# Patient Record
Sex: FEMALE | Race: WHITE | NOT HISPANIC OR LATINO | Employment: FULL TIME | URBAN - METROPOLITAN AREA
[De-identification: names, ages, dates, MRNs, and addresses within clinical notes are randomized per-mention and may not be internally consistent; named-entity substitution may affect disease eponyms.]

---

## 2023-11-19 ENCOUNTER — HOSPITAL ENCOUNTER (EMERGENCY)
Facility: HOSPITAL | Age: 33
Discharge: HOME | End: 2023-11-19
Attending: EMERGENCY MEDICINE | Admitting: EMERGENCY MEDICINE
Payer: COMMERCIAL

## 2023-11-19 VITALS
HEIGHT: 63 IN | RESPIRATION RATE: 15 BRPM | DIASTOLIC BLOOD PRESSURE: 76 MMHG | BODY MASS INDEX: 22.15 KG/M2 | WEIGHT: 125 LBS | SYSTOLIC BLOOD PRESSURE: 113 MMHG | TEMPERATURE: 98.3 F | HEART RATE: 97 BPM | OXYGEN SATURATION: 98 %

## 2023-11-19 DIAGNOSIS — L29.9 ITCHING: Primary | ICD-10-CM

## 2023-11-19 PROCEDURE — 93005 ELECTROCARDIOGRAM TRACING: CPT

## 2023-11-19 PROCEDURE — 99282 EMERGENCY DEPT VISIT SF MDM: CPT

## 2023-11-19 RX ORDER — EPINEPHRINE 0.3 MG/.3ML
0.3 INJECTION SUBCUTANEOUS ONCE
Qty: 1 EACH | Refills: 0 | Status: SHIPPED | OUTPATIENT
Start: 2023-11-19 | End: 2023-11-19

## 2023-11-20 LAB
QT INTERVAL: 370 MS
QTC INTERVAL: 414 MS

## 2023-11-20 NOTE — ED NOTES
Pt states Monday she develops hives on her face, neck, and back. Friday she got started on prednisone tablets. Pt states the hives are still intermittently developing still.

## 2023-11-20 NOTE — DISCHARGE INSTRUCTIONS
Follow-up with your primary care provider  Continue steroid  EpiPen prescribed in case of severe allergic reaction anaphylaxis  Recommend starting Pepcid  Return to ED for new or worsening

## 2023-11-20 NOTE — ED PROVIDER NOTES
Subjective   Chief Complaint   Patient presents with    Itching       History of Present Illness  Patient is a 33-year-old female presents the ED with complaint of itching, hives.  She was recently seen in her PCPs office for hives, started on prednisone.  She reports tonight she was concerned she had hives on her face.  Denies any shortness of breath, oral swelling or tongue swelling.  No nausea vomiting diarrhea.  No new exposures.  She reports they thought it was secondary to anxiety.  Patient reports that she had been waiting, symptoms have resolved.  Denies any complaints at this time  Review of Systems   Constitutional:  Negative for chills and fever.   Respiratory:  Negative for chest tightness, shortness of breath and wheezing.    Cardiovascular:  Negative for chest pain.   Gastrointestinal:  Negative for diarrhea and vomiting.   Musculoskeletal:  Negative for back pain and neck pain.   Skin:  Positive for rash. Negative for color change.       No past medical history on file.    Allergies   Allergen Reactions    Sulfa Antibiotics Hives       Past Surgical History:   Procedure Laterality Date     SECTION  2016       No family history on file.    Social History     Socioeconomic History    Marital status: Single   Tobacco Use    Smoking status: Never    Smokeless tobacco: Never   Substance and Sexual Activity    Alcohol use: No    Drug use: No    Sexual activity: Defer           Objective   Physical Exam  Vitals and nursing note reviewed.   Constitutional:       Appearance: Normal appearance. She is not ill-appearing or toxic-appearing.   HENT:      Head: Normocephalic and atraumatic.      Comments: Patient has no evidence for airway compromise, no angioedema, no oral or tongue swelling.     Nose: Nose normal.      Mouth/Throat:      Mouth: Mucous membranes are moist.      Pharynx: Oropharynx is clear.   Eyes:      Conjunctiva/sclera: Conjunctivae normal.      Pupils: Pupils are equal, round, and  "reactive to light.   Cardiovascular:      Rate and Rhythm: Normal rate and regular rhythm.      Heart sounds: Normal heart sounds. No murmur heard.     No friction rub. No gallop.   Pulmonary:      Effort: Pulmonary effort is normal.      Breath sounds: Normal breath sounds.   Abdominal:      General: Bowel sounds are normal.      Palpations: Abdomen is soft.      Tenderness: There is no abdominal tenderness. There is no guarding or rebound.   Musculoskeletal:         General: Normal range of motion.      Cervical back: Normal range of motion and neck supple.      Right lower leg: No edema.      Left lower leg: No edema.   Skin:     General: Skin is warm and dry.      Capillary Refill: Capillary refill takes less than 2 seconds.      Findings: No rash.   Neurological:      General: No focal deficit present.      Mental Status: She is alert and oriented to person, place, and time.   Psychiatric:         Mood and Affect: Mood normal.         Behavior: Behavior normal.         Procedures           ED Course  ED Course as of 11/20/23 0322   Mon Nov 20, 2023   0316 EKG sinus rhythm rate 75.  No previous available.  No acute ST change.  [facility] ED attending physician. [LB]      ED Course User Index  [LB] Brittanie Farmer, APRN      /76   Pulse 97   Temp 98.3 °F (36.8 °C) (Oral)   Resp 15   Ht 160 cm (63\")   Wt 56.7 kg (125 lb)   LMP 10/24/2023 (Approximate)   SpO2 98%   BMI 22.14 kg/m²   Medications - No data to display  No radiology results for the last day  Lab Results (last 24 hours)       ** No results found for the last 24 hours. **                                               Medical Decision Making  Problems Addressed:  Itching: complicated acute illness or injury    Risk  Prescription drug management.    Patient is a 33-year-old female presents to the ED with complaint of itching, hives.  She was recently treated with prednisone.  She reports that they were on her face neck and back " today, and may spread to her previous prompted coming to the ED, however she reports since she has been here they resolved.  I spoke with her at the bedside, we discussed further treatment monitoring here, however patient reports that she feels much better she would like to go home.  Advised her to continue her current prednisone, give her an EpiPen in case of emergency, and advised use of steroids as well.  Given her ENT and allergy follow-up.  I spoke with the patient at the bedside regarding their plan of care, discharge instruction,  prescriptions, as well as reasons to return to the emergency department.  We discussed test results at the bedside, including incidental abnormal labs, radiological findings, understands need and importance  for follow-up with primary care or specialist if indicated.  Patient verbalizes understanding and agrees to the treatment plan at this time.         Final diagnoses:   Itching       ED Disposition  ED Disposition       ED Disposition   Discharge    Condition   Stable    Comment   --               [facility] EMERGENCY DEPARTMENT  [address] [id number] [phone]    If symptoms worsen    PATIENT CONNECTION - [facility] [address]  [phone]  Schedule an appointment as soon as possible for a visit   Call for assistance with follow up with Primary care provider-call tomorrow.    [facility]  [address] [place] [id number] [phone]             Medication List        ASK your doctor about these medications      EPINEPHrine 0.3 MG/0.3ML solution auto-injector injection  Commonly known as: EPIPEN  Inject 0.3 mL under the skin into the appropriate area as directed 1 (One) Time for 1 dose.  Ask about: Should I take this medication?               Where to Get Your Medications        These medications were sent to [facility] #[id number] - [facility] [place], [place] - [address] AT [facility] [address] - [phone] PH - [phone] FX  [address] [place] [address]      Phone: [phone]   EPINEPHrine 0.3 MG/0.3ML solution auto-injector injection            Brittanie Farmer APRN  11/20/23 0326

## 2025-02-14 PROCEDURE — 87081 CULTURE SCREEN ONLY: CPT | Performed by: NURSE PRACTITIONER

## 2025-03-15 ENCOUNTER — HOSPITAL ENCOUNTER (EMERGENCY)
Facility: HOSPITAL | Age: 35
Discharge: HOME | End: 2025-03-15
Payer: COMMERCIAL

## 2025-03-15 VITALS
BODY MASS INDEX: 23.05 KG/M2 | RESPIRATION RATE: 18 BRPM | TEMPERATURE: 99.9 F | OXYGEN SATURATION: 97 % | SYSTOLIC BLOOD PRESSURE: 112 MMHG | WEIGHT: 130.07 LBS | DIASTOLIC BLOOD PRESSURE: 74 MMHG | HEART RATE: 111 BPM | HEIGHT: 63 IN

## 2025-03-15 DIAGNOSIS — R51.9 ACUTE NONINTRACTABLE HEADACHE, UNSPECIFIED HEADACHE TYPE: Primary | ICD-10-CM

## 2025-03-15 DIAGNOSIS — B34.8 RHINOVIRUS: ICD-10-CM

## 2025-03-15 LAB
B PARAPERT DNA SPEC QL NAA+PROBE: NOT DETECTED
B PERT DNA SPEC QL NAA+PROBE: NOT DETECTED
C PNEUM DNA NPH QL NAA+NON-PROBE: NOT DETECTED
FLUAV SUBTYP SPEC NAA+PROBE: NOT DETECTED
FLUBV RNA ISLT QL NAA+PROBE: NOT DETECTED
HADV DNA SPEC NAA+PROBE: NOT DETECTED
HCOV 229E RNA SPEC QL NAA+PROBE: NOT DETECTED
HCOV HKU1 RNA SPEC QL NAA+PROBE: NOT DETECTED
HCOV NL63 RNA SPEC QL NAA+PROBE: NOT DETECTED
HCOV OC43 RNA SPEC QL NAA+PROBE: NOT DETECTED
HMPV RNA NPH QL NAA+NON-PROBE: NOT DETECTED
HPIV1 RNA ISLT QL NAA+PROBE: NOT DETECTED
HPIV2 RNA SPEC QL NAA+PROBE: NOT DETECTED
HPIV3 RNA NPH QL NAA+PROBE: NOT DETECTED
HPIV4 P GENE NPH QL NAA+PROBE: NOT DETECTED
M PNEUMO IGG SER IA-ACNC: NOT DETECTED
RHINOVIRUS RNA SPEC NAA+PROBE: DETECTED
RSV RNA NPH QL NAA+NON-PROBE: NOT DETECTED
SARS-COV-2 RNA RESP QL NAA+PROBE: NOT DETECTED

## 2025-03-15 PROCEDURE — 0202U NFCT DS 22 TRGT SARS-COV-2: CPT | Performed by: NURSE PRACTITIONER

## 2025-03-15 PROCEDURE — 25810000003 SODIUM CHLORIDE 0.9 % SOLUTION: Performed by: NURSE PRACTITIONER

## 2025-03-15 PROCEDURE — 25010000002 KETOROLAC TROMETHAMINE PER 15 MG: Performed by: NURSE PRACTITIONER

## 2025-03-15 PROCEDURE — 25010000002 DIPHENHYDRAMINE PER 50 MG: Performed by: NURSE PRACTITIONER

## 2025-03-15 PROCEDURE — 96374 THER/PROPH/DIAG INJ IV PUSH: CPT

## 2025-03-15 PROCEDURE — 25010000002 PROCHLORPERAZINE 10 MG/2ML SOLUTION: Performed by: NURSE PRACTITIONER

## 2025-03-15 PROCEDURE — 96375 TX/PRO/DX INJ NEW DRUG ADDON: CPT

## 2025-03-15 PROCEDURE — 99283 EMERGENCY DEPT VISIT LOW MDM: CPT

## 2025-03-15 RX ORDER — ACETAMINOPHEN 500 MG
1000 TABLET ORAL ONCE
Status: COMPLETED | OUTPATIENT
Start: 2025-03-15 | End: 2025-03-15

## 2025-03-15 RX ORDER — DIPHENHYDRAMINE HYDROCHLORIDE 50 MG/ML
25 INJECTION INTRAMUSCULAR; INTRAVENOUS ONCE
Status: COMPLETED | OUTPATIENT
Start: 2025-03-15 | End: 2025-03-15

## 2025-03-15 RX ORDER — PROCHLORPERAZINE EDISYLATE 5 MG/ML
10 INJECTION INTRAMUSCULAR; INTRAVENOUS ONCE
Status: COMPLETED | OUTPATIENT
Start: 2025-03-15 | End: 2025-03-15

## 2025-03-15 RX ORDER — SODIUM CHLORIDE 0.9 % (FLUSH) 0.9 %
10 SYRINGE (ML) INJECTION AS NEEDED
Status: DISCONTINUED | OUTPATIENT
Start: 2025-03-15 | End: 2025-03-15 | Stop reason: HOSPADM

## 2025-03-15 RX ORDER — KETOROLAC TROMETHAMINE 30 MG/ML
15 INJECTION, SOLUTION INTRAMUSCULAR; INTRAVENOUS ONCE
Status: COMPLETED | OUTPATIENT
Start: 2025-03-15 | End: 2025-03-15

## 2025-03-15 RX ADMIN — SODIUM CHLORIDE 1000 ML: 9 INJECTION, SOLUTION INTRAVENOUS at 03:00

## 2025-03-15 RX ADMIN — PROCHLORPERAZINE EDISYLATE 10 MG: 5 INJECTION, SOLUTION INTRAMUSCULAR; INTRAVENOUS at 03:00

## 2025-03-15 RX ADMIN — KETOROLAC TROMETHAMINE 15 MG: 30 INJECTION, SOLUTION INTRAMUSCULAR at 03:00

## 2025-03-15 RX ADMIN — DIPHENHYDRAMINE HYDROCHLORIDE 25 MG: 50 INJECTION, SOLUTION INTRAMUSCULAR; INTRAVENOUS at 03:00

## 2025-03-15 RX ADMIN — ACETAMINOPHEN 1000 MG: 500 TABLET, FILM COATED ORAL at 03:00

## 2025-03-15 NOTE — DISCHARGE INSTRUCTIONS
May continue alternating Tylenol and ibuprofen for discomfort.  May consider over-the-counter Stahist or Mucinex for congestion.  Drink plenty of fluids.  Rest today and tomorrow.  Follow-up with primary care provider as needed.  Return for new or worsening symptoms.

## 2025-03-15 NOTE — ED PROVIDER NOTES
Subjective   History of Present Illness  Patient is a 34-year-old white female with history of migraine headaches presents today with complaints of headache for the last 2 days and is not relieved with over-the-counter medications which normally relieve her migraine headaches.  She reports nausea but no vomiting.  Some dizziness and lightheadedness.  She does report some chills and has had some nasal congestion postnasal drip and cough.  Denies any fever neck pain or stiffness.  No rash.  No thunderclap onset.      Review of Systems   Constitutional:  Positive for chills.   HENT:  Positive for congestion, postnasal drip and rhinorrhea.    Respiratory:  Positive for cough.    Cardiovascular:  Negative for chest pain.   Gastrointestinal:  Positive for nausea. Negative for abdominal pain and vomiting.   Musculoskeletal:  Negative for neck pain and neck stiffness.   Skin:  Negative for rash.   Neurological:  Positive for dizziness and headaches.       No past medical history on file.    Allergies   Allergen Reactions    Sulfa Antibiotics Hives       Past Surgical History:   Procedure Laterality Date     SECTION      second c  section        No family history on file.    Social History     Socioeconomic History    Marital status: Single   Tobacco Use    Smoking status: Never     Passive exposure: Never    Smokeless tobacco: Never   Vaping Use    Vaping status: Never Used   Substance and Sexual Activity    Alcohol use: Yes     Comment: rarely    Drug use: No    Sexual activity: Defer           Objective   Physical Exam  Vital signs and triage nurse note reviewed.  Constitutional: Awake, alert; well-developed and well-nourished. No acute distress is noted.  HEENT: Normocephalic, atraumatic; pupils are PERRL with intact EOM; oropharynx is pink and moist without exudate or erythema.  No drooling or pooling of oral secretions.  Neck: Supple, full range of motion without pain; no cervical lymphadenopathy.  Normal phonation.  Cardiovascular: Regular rate and rhythm, normal S1-S2.  No murmur noted.  Pulmonary: Respiratory effort regular nonlabored, breath sounds clear to auscultation all fields.  Musculoskeletal: Independent range of motion of all extremities with no palpable tenderness or edema.  Neuro: Alert oriented x3, speech is clear and appropriate, GCS 15.    Skin: Flesh tone, warm, dry, intact; no erythematous or petechial rash or lesion.    Procedures           ED Course        Labs Reviewed   RESPIRATORY PANEL PCR W/ COVID-19 (SARS-COV-2), NP SWAB IN UTM/VTP, 2 HR TAT - Abnormal; Notable for the following components:       Result Value    Human Rhinovirus/Enterovirus Detected (*)     All other components within normal limits    Narrative:     In the setting of a positive respiratory panel with a viral infection PLUS a negative procalcitonin without other underlying concern for bacterial infection, consider observing off antibiotics or discontinuation of antibiotics and continue supportive care. If the respiratory panel is positive for atypical bacterial infection (Bordetella pertussis, Chlamydophila pneumoniae, or Mycoplasma pneumoniae), consider antibiotic de-escalation to target atypical bacterial infection.     No radiology results for the last day  Medications   sodium chloride 0.9 % bolus 1,000 mL (0 mL Intravenous Stopped 3/15/25 0431)   acetaminophen (TYLENOL) tablet 1,000 mg (1,000 mg Oral Given 3/15/25 0300)   diphenhydrAMINE (BENADRYL) injection 25 mg (25 mg Intravenous Given 3/15/25 0300)   prochlorperazine (COMPAZINE) injection 10 mg (10 mg Intravenous Given 3/15/25 0300)   ketorolac (TORADOL) injection 15 mg (15 mg Intravenous Given 3/15/25 0300)                                                    Medical Decision Making  Patient presents today with the above complaint.    She had above exam and evaluation.  IV was established.  Nasal swab was obtained.  Patient was given a liter of IV fluids as  well as IV Toradol Compazine and Benadryl.  She is also given Tylenol for low-grade temperature of 99.9 degrees.    On reexamination, patient is sleeping and easily arousable.  She is well-appearing and in no distress.  She has no new complaints.  She reports improvement in her headache.  She is neurovascularly intact.  She is ambulatory without difficulty.    Findings were discussed with the patient.  She will be discharged home instructed follow-up with her primary care provider.  She was given warning signs for prompt return to the ED voiced understanding.    Problems Addressed:  Acute nonintractable headache, unspecified headache type: complicated acute illness or injury  Rhinovirus: complicated acute illness or injury    Risk  OTC drugs.  Prescription drug management.        Final diagnoses:   Acute nonintractable headache, unspecified headache type   Rhinovirus       ED Disposition  ED Disposition       ED Disposition   Discharge    Condition   Stable    Comment   --               PATIENT CONNECTION - [facility] [address]  [phone]    As needed         Medication List      No changes were made to your prescriptions during this visit.            Thu Delgado, APRN  03/15/25 1929